# Patient Record
Sex: MALE | Race: WHITE | Employment: FULL TIME | URBAN - NONMETROPOLITAN AREA
[De-identification: names, ages, dates, MRNs, and addresses within clinical notes are randomized per-mention and may not be internally consistent; named-entity substitution may affect disease eponyms.]

---

## 2022-09-06 ENCOUNTER — OFFICE VISIT (OUTPATIENT)
Dept: FAMILY MEDICINE CLINIC | Age: 27
End: 2022-09-06

## 2022-09-06 VITALS
SYSTOLIC BLOOD PRESSURE: 118 MMHG | RESPIRATION RATE: 18 BRPM | TEMPERATURE: 97.1 F | HEART RATE: 102 BPM | OXYGEN SATURATION: 98 % | DIASTOLIC BLOOD PRESSURE: 74 MMHG | HEIGHT: 72 IN | BODY MASS INDEX: 22.62 KG/M2 | WEIGHT: 167 LBS

## 2022-09-06 DIAGNOSIS — M79.641 HAND PAIN, RIGHT: Primary | ICD-10-CM

## 2022-09-06 PROCEDURE — 99203 OFFICE O/P NEW LOW 30 MIN: CPT | Performed by: NURSE PRACTITIONER

## 2022-09-06 NOTE — PROGRESS NOTES
Chief Complaint:   Hand Pain (Patient stats that he hit his right hand on the basketball rim 6 weeks ago. . has not gotten any better. . hurts to bend. . states pain is mainly all right thumb)    History of Present Illness   Source of history provided by:  patient. Elin Villagomez is a 31 y.o. old male who presents to the [facility] complaining of Right hand/thumb pain for the past 5-6 weeks. Pt states there was a traumatic incident in which he was playing basketball and possibly injured It at that time. Pt states there was initially associated bruising and swelling to the area, that has since improved. Denies any paresthesias, weakness in the extremity, abrasions, active bleeding, recent illness, or any other complaints today. He is right hand dominant. Review of Systems   Unless otherwise stated in this report or unable to obtain because of the patient's clinical or mental status as evidenced by the medical record, this patients's positive and negative responses for Review of Systems, constitutional, psych, eyes, ENT, cardiovascular, respiratory, gastrointestinal, neurological, genitourinary, musculoskeletal, integument systems and systems related to the presenting problem are either stated in the preceding or were not pertinent or were negative for the symptoms and/or complaints related to the medical problem. Past Medical History:  has a past medical history of Hiatal hernia and Mononucleosis syndrome. Past Surgical History:  has a past surgical history that includes Upper gastrointestinal endoscopy. Social History:  reports that he does not drink alcohol and does not use drugs. Family History: family history is not on file. Allergies: Patient has no known allergies.     Physical Exam   Vital Signs:  /74   Pulse (!) 102   Temp 97.1 °F (36.2 °C)   Resp 18   Ht 6' (1.829 m)   Wt 167 lb (75.8 kg)   SpO2 98%   BMI 22.65 kg/m²    Oxygen Saturation Interpretation: Normal.    Constitutional:  A&Ox3, development consistent with age, NAD. CV: Heart RRR, no murmurs, rubs, or gallops. Lungs: CTAB without W/R/R. Wrist: Right              Tenderness:  None            Swelling: None             Deformity: No obvious deformity noted. ROM: Decreased ROM due to pain. Skin:  No erythema, rashes, or abrasions noted. Neurovascular: Motor deficit: /UE strength 5/5 bilaterally. No increased pain with supination or pronation of the hand. Sensory deficit:   Sensation intact proximally and distally to the injury site. Pulse deficit: 2+ and bounding. Capillary refill: Less then 2 sec throughout. Hand: Right              Tenderness: Moderate to proximal thumb on palmar aspect. Swelling: None noted. Deformity: No obvious deformity. No malrotation noted. ROM: Decreased ROM especially to thumb              Skin:  No abrasions, erythema, or rashes noted. Neurological:  Alert and oriented. Motor functions intact. Test Results Section   (All laboratory and radiology results have been personally reviewed by myself)  Imaging: All Radiology results interpreted by Radiologist unless otherwise noted. XR HAND RIGHT (MIN 3 VIEWS)    Result Date: 9/6/2022  EXAMINATION: THREE XRAY VIEWS OF THE RIGHT HAND 9/6/2022 2:57 pm COMPARISON: None. HISTORY: ORDERING SYSTEM PROVIDED HISTORY: Hand pain, right TECHNOLOGIST PROVIDED HISTORY: Reason for exam:->pain, injury FINDINGS: Three views right hand demonstrate normal alignment without evidence of fracture or subluxation. There is no evidence of a radiopaque foreign body. Unremarkable right hand series. Thank you very much for this referral!      Assessment / Plan   Impression(s):  Claudia Solano was seen today for hand pain. Diagnoses and all orders for this visit:    Hand pain, right  -     XR HAND RIGHT (MIN 3 VIEWS);  Future    X-ray of right hand obtained in office showing normal alignment without evidence of fracture or subluxation, patient was advised these results. Advised this was likely a ligament or tendon issue, since this has been ongoing for 5 to 6 weeks we did recommend occupational therapy however he is refusing at this time. States he will call back in if he is having further issues and wants and OT referral.  Patient was advised to follow-up with his PCP if symptoms persist. ER sooner if symptoms persist, worsen, or change. Red flag symptoms discussed. All questions answered. Return if symptoms worsen or fail to improve. Electronically signed by HARIS Coughlin CNP   DD: 9/6/22    **This report was transcribed using voice recognition software. Every effort was made to ensure accuracy; however, inadvertent computerized transcription errors may be present.

## 2022-10-11 ENCOUNTER — OFFICE VISIT (OUTPATIENT)
Dept: FAMILY MEDICINE CLINIC | Age: 27
End: 2022-10-11

## 2022-10-11 VITALS
OXYGEN SATURATION: 100 % | BODY MASS INDEX: 23.27 KG/M2 | TEMPERATURE: 98.8 F | HEART RATE: 73 BPM | WEIGHT: 171.8 LBS | RESPIRATION RATE: 18 BRPM | DIASTOLIC BLOOD PRESSURE: 78 MMHG | SYSTOLIC BLOOD PRESSURE: 124 MMHG | HEIGHT: 72 IN

## 2022-10-11 DIAGNOSIS — L02.619 CELLULITIS AND ABSCESS OF FOOT: Primary | ICD-10-CM

## 2022-10-11 DIAGNOSIS — L03.119 CELLULITIS AND ABSCESS OF FOOT: Primary | ICD-10-CM

## 2022-10-11 PROCEDURE — 99213 OFFICE O/P EST LOW 20 MIN: CPT | Performed by: PHYSICIAN ASSISTANT

## 2022-10-11 RX ORDER — CEPHALEXIN 500 MG/1
500 CAPSULE ORAL 4 TIMES DAILY
Qty: 40 CAPSULE | Refills: 0 | Status: SHIPPED | OUTPATIENT
Start: 2022-10-11 | End: 2022-10-21

## 2022-10-11 RX ORDER — SULFAMETHOXAZOLE AND TRIMETHOPRIM 800; 160 MG/1; MG/1
1 TABLET ORAL 2 TIMES DAILY
Qty: 20 TABLET | Refills: 0 | Status: SHIPPED | OUTPATIENT
Start: 2022-10-11 | End: 2022-10-21

## 2022-10-11 NOTE — PROGRESS NOTES
Chief Complaint   Foot Pain (X 1 week)      History of Present Illness   Source of history provided by: patient. Kwaku Villegas is a 31 y.o. old male presenting to the walk in clinic for evaluation of an erythematous and swollen lesion to the dorsum of the right foot, which began 7 days ago. Patient states that the area initially looked like a blister. He attempted to pop it at home. Reports the area is now warm to touch, moderately painful, and swollen. Denies lymphangitic streaking. Reports intermittent bleeding and purulent drainage from the site. Since onset the symptoms have progressed. Denies any fever, chills, lymphangitic streaking, HA, recent illness, myalgias, nausea, vomiting, or lethargy. ROS    Unless otherwise stated in this report or unable to obtain because of the patient's clinical or mental status as evidenced by the medical record, this patients's positive and negative responses for Review of Systems, constitutional, psych, eyes, ENT, cardiovascular, respiratory, gastrointestinal, neurological, genitourinary, musculoskeletal, integument systems and systems related to the presenting problem are either stated in the preceding or were not pertinent or were negative for the symptoms and/or complaints related to the medical problem. Past Medical History:  has a past medical history of Hiatal hernia and Mononucleosis syndrome. Past Surgical History:  has a past surgical history that includes Upper gastrointestinal endoscopy. Social History:  reports that he has never smoked. He does not have any smokeless tobacco history on file. He reports that he does not drink alcohol and does not use drugs. Family History: family history is not on file. Allergies: Patient has no known allergies.     Physical Exam         VS:  /78   Pulse 73   Temp 98.8 °F (37.1 °C)   Resp 18   Ht 6' (1.829 m)   Wt 171 lb 12.8 oz (77.9 kg)   SpO2 100%   BMI 23.30 kg/m²    Oxygen Saturation Interpretation: Normal.    Constitutional:  Alert, development consistent with age. NAD. Lungs:  CTAB without wheezing, rales, or rhonchi. Heart:  Regular rate and rhythm, no pathologic murmurs, rubs, or gallops. Skin:  Normal turgor and appropriately dry to touch. Raised, erythematous region over the dorsum of the right foot measuring approximately 1.5 cm in size, consistent with an abscess. Moderate TTP and warmth over the same area. Purulent drainage and crusted blood noted at the site. No lymphangitic streaking. Neurological:  Orientation age-appropriate unless noted elseware. Motor functions intact. Lab / Imaging Results   (All laboratory and radiology results have been personally reviewed by myself)  Labs:      Imaging: All Radiology results interpreted by Radiologist unless otherwise noted. Assessment / Plan     Impression(s):  Mónica Castillo was seen today for foot pain. Diagnoses and all orders for this visit:    Cellulitis and abscess of foot  -     cephALEXin (KEFLEX) 500 MG capsule; Take 1 capsule by mouth 4 times daily for 10 days  -     sulfamethoxazole-trimethoprim (BACTRIM DS;SEPTRA DS) 800-160 MG per tablet; Take 1 tablet by mouth 2 times daily for 10 days  -     mupirocin (BACTROBAN) 2 % ointment; ATAA TID until resolved. Disposition:  Disposition: Discharge to home. Scripts written for Keflex, Bactrim, and topical Bactroban ointment, side effects discussed. Wound care measures discussed at length. Return in 48-72 hours for recheck. Patient was educated on signs and symptoms to watch for that would warrant a visit to the emergency department. ED sooner if symptoms worsen or change. ED immediately with the development of fever, body aches, shaking chills, spreading erythema, lymphangitic streaking, lethargy, CP, or SOB. Pt is in agreement with this care plan. All questions answered. Singh Spears PA-C    **This report was transcribed using voice recognition software. Every effort was made to ensure accuracy; however, inadvertent computerized transcription errors may be present.